# Patient Record
Sex: MALE | Race: WHITE | NOT HISPANIC OR LATINO | Employment: FULL TIME | URBAN - METROPOLITAN AREA
[De-identification: names, ages, dates, MRNs, and addresses within clinical notes are randomized per-mention and may not be internally consistent; named-entity substitution may affect disease eponyms.]

---

## 2023-10-19 PROBLEM — F64.9 GENDER DYSPHORIA: Status: ACTIVE | Noted: 2023-10-19

## 2023-10-19 RX ORDER — DICYCLOMINE HYDROCHLORIDE 10 MG/1
CAPSULE ORAL
COMMUNITY
Start: 2021-12-23

## 2023-10-19 RX ORDER — TESTOSTERONE CYPIONATE 1000 MG/10ML
INJECTION, SOLUTION INTRAMUSCULAR
COMMUNITY
Start: 2021-10-28

## 2023-10-23 ENCOUNTER — OFFICE VISIT (OUTPATIENT)
Dept: UROLOGY | Facility: CLINIC | Age: 23
End: 2023-10-23
Payer: COMMERCIAL

## 2023-10-23 VITALS
BODY MASS INDEX: 34.04 KG/M2 | HEIGHT: 68 IN | DIASTOLIC BLOOD PRESSURE: 68 MMHG | WEIGHT: 224.6 LBS | HEART RATE: 85 BPM | TEMPERATURE: 97.7 F | SYSTOLIC BLOOD PRESSURE: 104 MMHG

## 2023-10-23 DIAGNOSIS — F64.9 GENDER DYSPHORIA: Primary | ICD-10-CM

## 2023-10-23 PROCEDURE — 99214 OFFICE O/P EST MOD 30 MIN: CPT | Performed by: NURSE PRACTITIONER

## 2023-10-23 PROCEDURE — 1036F TOBACCO NON-USER: CPT | Performed by: NURSE PRACTITIONER

## 2023-10-23 ASSESSMENT — PAIN SCALES - GENERAL: PAINLEVEL: 0-NO PAIN

## 2023-10-23 NOTE — PROGRESS NOTES
GENDER CARE FOLLOW UP    PROBLEM LIST:  1. Gender dysphoria             HISTORY OF PRESENT ILLNESS:  Megha Valdez is a 22 y.o. trans man who is seen for discussion of gender-affirming phalloplasty.  Pronouns are he/him.  Lives in [facility]  Works at [employer]    Age of earliest memory of gender incongruence: age 11  Body part that causes the most dysphoria: genitals  Started hormones: age 17    Currently partnered: Yes   Goals of surgery: sexual function in congruent gender    Mental health issues: Depression  Substance use: quit smoking 2022  Medical issues:  None  Hair removal: No     PAST MEDICAL HISTORY:  Past Medical History:   Diagnosis Date    Other conditions influencing health status     No significant past medical history    Other specified health status     No pertinent past medical history       PAST SURGICAL HISTORY:  Past Surgical History:   Procedure Laterality Date    MASTECTOMY      OTHER SURGICAL HISTORY  07/14/2022    Hernia repair        ALLERGIES:   No Known Allergies     MEDICATIONS:   Current Outpatient Medications on File Prior to Visit   Medication Sig Dispense Refill    dicyclomine (Bentyl) 10 mg capsule Take by mouth.      testosterone cypionate (Depo-Testosterone) 100 mg/mL injection Inject into the muscle.       No current facility-administered medications on file prior to visit.        SOCIAL HISTORY:  Patient  reports that he has quit smoking. His smoking use included cigarettes. He has never been exposed to tobacco smoke. He has never used smokeless tobacco.   Social History     Socioeconomic History    Marital status: Single     Spouse name: Not on file    Number of children: Not on file    Years of education: Not on file    Highest education level: Not on file   Occupational History    Not on file   Tobacco Use    Smoking status: Former     Types: Cigarettes     Passive exposure: Never    Smokeless tobacco: Never   Substance and Sexual Activity    Alcohol use: Not on  "file    Drug use: Not on file    Sexual activity: Not on file   Other Topics Concern    Not on file   Social History Narrative    Not on file     Social Determinants of Health     Financial Resource Strain: Not on file   Food Insecurity: Not on file   Transportation Needs: Not on file   Physical Activity: Not on file   Stress: Not on file   Social Connections: Not on file   Intimate Partner Violence: Not on file   Housing Stability: Not on file       FAMILY HISTORY:  No family history on file.    REVIEW OF SYSTEMS:  All systems reviewed, pertinent negatives as noted in HPI.     PHYSICAL EXAM:  Visit Vitals  /68 (BP Location: Right arm, Patient Position: Sitting)   Pulse 85   Temp 36.5 °C (97.7 °F)     Constitutional: Well-developed. No acute distress.    Head: Atraumatic. Mucus membranes moist.  Neck: Normal range of motion.    Pulmonary/Chest: Effort normal. No respiratory distress.   Abdominal: Nondistended.  : Deferred  Integumentary: No rash or lesions.  Musculoskeletal: Normal range of motion.    Neurological: Alert and oriented to person, place, and time.  Psychiatric: Normal mood and affect. Thought content normal.      LABORATORY REVIEW:   No results found for: \"GLU\", \"BUN\", \"CREATININE\", \"EGFR\", \"NA\", \"K\", \"CL\", \"CO2\", \"OSMOLALITY\", \"CALCIUM\"   No results found for: \"WBC\", \"RBC\", \"HGB\", \"HCT\", \"MCV\", \"MCH\", \"MCHC\", \"RDW\", \"PLT\", \"MPV\"        Assessment:      1. Gender dysphoria            Megha Valdez is a 22 y.o. trans man interested in gender-affirming  phalloplasty .  Social support: Yes   Smoker: No   Diabetic: No  Body mass index is 34.15 kg/m².       Plan:   Extensive discussion of masculinzing surgical options as below  Letters of support submitted but not clear if vetted yet by program navigator  Once letters have been vetted, will reach out to schedule surgery  Reviewed surgical pathway and provided educational materials & link to patient education site  Encouraged to call in interim with " questions, concerns      We discussed the [facility]PATH crietria for bottom surgery  Discussed the various forms of masculinizing bottom surgery  These include metoidioplasty and phalloplasty (radial forearm flap, abdominal, and anterolateral thigh)    Metoidioplasty  Can be combined with urethral lengthening and vaginectomy in a single operation  Outward appearance after metoidioplasty, the potential ability to void standing up, but not have penetrative intercourse  Preservation of erogenous sensation and the continued ability to have orgasms  Optional interval placement of testicular prostheses    Discussed the surgery and postop course, including need for a catheter and a suprapubic tube for several weeks    Phalloplasty  Surgery is completed in multiple stages at intervals of at least 6 months  The first stage involves placement of a flap in the groin  The second stage involves urethral join-up and glans formation  The third stage involves placement of a penile and testicular prostheses    Discussed potential complications associated with phalloplasty  These include flap necrosis, urethral stricture, fistula, diverticulum, and need for multiple surgeries and repairs    We discussed the need for permanent hair removal, and briefly discussed laser versus electrolysis

## 2025-02-13 ENCOUNTER — APPOINTMENT (OUTPATIENT)
Dept: UROLOGY | Facility: CLINIC | Age: 25
End: 2025-02-13
Payer: COMMERCIAL

## 2025-02-14 ENCOUNTER — TELEPHONE (OUTPATIENT)
Dept: UROLOGY | Facility: CLINIC | Age: 25
End: 2025-02-14
Payer: COMMERCIAL

## 2025-02-14 NOTE — TELEPHONE ENCOUNTER
CPT 89643 valid & billable, 20% co-ins after deductible is met  OON paid @ 60% and member will pay 40% plus incur a $300 penalty fee for no auth being obtained per rep Mary Ref# [id number]

## 2025-02-20 ENCOUNTER — APPOINTMENT (OUTPATIENT)
Dept: UROLOGY | Facility: CLINIC | Age: 25
End: 2025-02-20
Payer: COMMERCIAL